# Patient Record
Sex: FEMALE | URBAN - METROPOLITAN AREA
[De-identification: names, ages, dates, MRNs, and addresses within clinical notes are randomized per-mention and may not be internally consistent; named-entity substitution may affect disease eponyms.]

---

## 2022-01-05 ENCOUNTER — OFFICE VISIT (OUTPATIENT)
Dept: URBAN - METROPOLITAN AREA TELEHEALTH 2 | Facility: TELEHEALTH | Age: 47
End: 2022-01-05

## 2022-01-05 ENCOUNTER — TELEPHONE ENCOUNTER (OUTPATIENT)
Dept: URBAN - METROPOLITAN AREA CLINIC 92 | Facility: CLINIC | Age: 47
End: 2022-01-05

## 2022-01-05 ENCOUNTER — WEB ENCOUNTER (OUTPATIENT)
Dept: URBAN - METROPOLITAN AREA CLINIC 92 | Facility: CLINIC | Age: 47
End: 2022-01-05

## 2022-01-31 ENCOUNTER — OFFICE VISIT (OUTPATIENT)
Dept: URBAN - METROPOLITAN AREA TELEHEALTH 2 | Facility: TELEHEALTH | Age: 47
End: 2022-01-31
Payer: COMMERCIAL

## 2022-01-31 ENCOUNTER — TELEPHONE ENCOUNTER (OUTPATIENT)
Dept: URBAN - METROPOLITAN AREA CLINIC 17 | Facility: CLINIC | Age: 47
End: 2022-01-31

## 2022-01-31 DIAGNOSIS — R10.11 RIGHT UPPER QUADRANT PAIN: ICD-10-CM

## 2022-01-31 DIAGNOSIS — Z12.11 COLON CANCER SCREENING: ICD-10-CM

## 2022-01-31 DIAGNOSIS — K21.9 GERD WITHOUT ESOPHAGITIS: ICD-10-CM

## 2022-01-31 DIAGNOSIS — R63.4 WEIGHT LOSS: ICD-10-CM

## 2022-01-31 PROCEDURE — 99204 OFFICE O/P NEW MOD 45 MIN: CPT | Performed by: INTERNAL MEDICINE

## 2022-01-31 PROCEDURE — 99244 OFF/OP CNSLTJ NEW/EST MOD 40: CPT | Performed by: INTERNAL MEDICINE

## 2022-01-31 RX ORDER — OMEPRAZOLE 20 MG/1
CAPSULE, DELAYED RELEASE ORAL
Qty: 30 | Refills: 0 | Status: ACTIVE | COMMUNITY

## 2022-01-31 RX ORDER — SODIUM, POTASSIUM,MAG SULFATES 17.5-3.13G
354 ML SOLUTION, RECONSTITUTED, ORAL ORAL
Qty: 354 ML | Refills: 0 | OUTPATIENT
Start: 2022-01-31 | End: 2022-02-01

## 2022-01-31 NOTE — HPI-TODAY'S VISIT:
This is a 45 yo referred by Dr. Luisito Aceves for a colonoscopy.  A copy of this document will be sent to the referring provider. The patient denies abdominal pain, weight loss, rectal bleeding, constipation, diarrhea, and a change in bowel habits.  There is no family history of colon cancer or colon polyps.  Patient reports epigastric discomfort a few months ago.  An US was negative for gallbladder disease.  The pain is constant but not exacerbated with meals.  Burning pain  was relieved with omeprazole.  She admits to severe nausea resulting in a poor appetite.  She lost 18 pounds over 5 months.    She denies vomiting and dysphagia.  She denies black stools.  Naprosyn was prescribed for RUQ discomfort which she has not taken often.

## 2022-03-28 PROBLEM — 266435005: Status: ACTIVE | Noted: 2022-01-31

## 2022-04-08 ENCOUNTER — OFFICE VISIT (OUTPATIENT)
Dept: URBAN - METROPOLITAN AREA SURGERY CENTER 30 | Facility: SURGERY CENTER | Age: 47
End: 2022-04-08

## 2023-08-10 ENCOUNTER — LAB OUTSIDE AN ENCOUNTER (OUTPATIENT)
Dept: URBAN - METROPOLITAN AREA CLINIC 92 | Facility: CLINIC | Age: 48
End: 2023-08-10

## 2023-08-10 ENCOUNTER — OFFICE VISIT (OUTPATIENT)
Dept: URBAN - METROPOLITAN AREA CLINIC 92 | Facility: CLINIC | Age: 48
End: 2023-08-10
Payer: COMMERCIAL

## 2023-08-10 VITALS
TEMPERATURE: 97.2 F | HEIGHT: 62 IN | WEIGHT: 159 LBS | HEART RATE: 70 BPM | DIASTOLIC BLOOD PRESSURE: 74 MMHG | BODY MASS INDEX: 29.26 KG/M2 | SYSTOLIC BLOOD PRESSURE: 119 MMHG

## 2023-08-10 DIAGNOSIS — R10.821 RIGHT UPPER QUADRANT ABDOMINAL TENDERNESS WITH REBOUND TENDERNESS: ICD-10-CM

## 2023-08-10 PROCEDURE — 99213 OFFICE O/P EST LOW 20 MIN: CPT | Performed by: INTERNAL MEDICINE

## 2023-08-10 RX ORDER — OMEPRAZOLE 20 MG/1
CAPSULE, DELAYED RELEASE ORAL
Qty: 30 | Refills: 0 | Status: ON HOLD | COMMUNITY

## 2023-08-10 NOTE — HPI-OTHER HISTORIES
(January 2022) This is a 47 yo referred by Dr. Luisito Aceves for a colonoscopy.  A copy of this document will be sent to the referring provider. The patient denies abdominal pain, weight loss, rectal bleeding, constipation, diarrhea, and a change in bowel habits.  There is no family history of colon cancer or colon polyps.  Patient reports epigastric discomfort a few months ago.  An US was negative for gallbladder disease.  The pain is constant but not exacerbated with meals.  Burning pain  was relieved with omeprazole.  She admits to severe nausea resulting in a poor appetite.  She lost 18 pounds over 5 months.    She denies vomiting and dysphagia.  She denies black stools.  Naprosyn was prescribed for RUQ discomfort which she has not taken often.

## 2023-08-10 NOTE — PHYSICAL EXAM GASTROINTESTINAL
Abdomen,  soft, RUQ pain, nondistended,  normal bowel sounds,  Liver and Spleen,  no hepatomegaly present

## 2023-08-10 NOTE — HPI-TODAY'S VISIT:
This is a 49 yo female here for abdominal pain.  She was seen one year ago for similar complaints.  An EGD/colon was recommmended.  It was scheduled 2 months out but the patient wanted it sooner so it was done by another GI MD by Dr. Vasquez with [facility].   Per the patient's report both exams were normal.  Acid reflux symptoms resolved with meds and behavior modifications however she continues to experience RUQ pain once a  month which lasts a few days.  An US 2021 was negative for gallbladder disease was negative.   She denies vomiting, early satiety and weight loss.

## 2023-08-10 NOTE — PHYSICAL EXAM RECTAL:
normal tone, no external hemorrhoids, no masses palpable, no red blood, Tenderness on REAL, Internal hemorrhoids present

## 2024-02-22 ENCOUNTER — OV EP (OUTPATIENT)
Dept: URBAN - METROPOLITAN AREA CLINIC 92 | Facility: CLINIC | Age: 49
End: 2024-02-22

## 2024-02-22 RX ORDER — OMEPRAZOLE 20 MG/1
CAPSULE, DELAYED RELEASE ORAL
Qty: 30 | Refills: 0 | COMMUNITY

## 2024-02-29 ENCOUNTER — OV EP (OUTPATIENT)
Dept: URBAN - METROPOLITAN AREA CLINIC 98 | Facility: CLINIC | Age: 49
End: 2024-02-29

## 2024-03-08 ENCOUNTER — OV EP (OUTPATIENT)
Dept: URBAN - METROPOLITAN AREA CLINIC 105 | Facility: CLINIC | Age: 49
End: 2024-03-08

## 2024-03-14 ENCOUNTER — OV EP (OUTPATIENT)
Dept: URBAN - METROPOLITAN AREA CLINIC 98 | Facility: CLINIC | Age: 49
End: 2024-03-14

## 2024-03-21 ENCOUNTER — OV EP (OUTPATIENT)
Dept: URBAN - METROPOLITAN AREA CLINIC 98 | Facility: CLINIC | Age: 49
End: 2024-03-21
Payer: SELF-PAY

## 2024-03-21 ENCOUNTER — LAB (OUTPATIENT)
Dept: URBAN - METROPOLITAN AREA CLINIC 98 | Facility: CLINIC | Age: 49
End: 2024-03-21

## 2024-03-21 VITALS
WEIGHT: 160 LBS | HEART RATE: 73 BPM | DIASTOLIC BLOOD PRESSURE: 74 MMHG | BODY MASS INDEX: 29.44 KG/M2 | HEIGHT: 62 IN | SYSTOLIC BLOOD PRESSURE: 116 MMHG | TEMPERATURE: 97.5 F

## 2024-03-21 DIAGNOSIS — R10.11 RIGHT UPPER QUADRANT PAIN: ICD-10-CM

## 2024-03-21 DIAGNOSIS — K21.9 GERD WITHOUT ESOPHAGITIS: ICD-10-CM

## 2024-03-21 PROCEDURE — 99214 OFFICE O/P EST MOD 30 MIN: CPT | Performed by: INTERNAL MEDICINE

## 2024-03-21 RX ORDER — OMEPRAZOLE 20 MG/1
CAPSULE, DELAYED RELEASE ORAL
Qty: 30 | Refills: 0 | COMMUNITY

## 2024-03-21 NOTE — HPI-TODAY'S VISIT:
Ms. David is a  47 yo female here for followup patient visit for abdominal pain. Sees Dr. Varghese. Last seen on 8/10/23.   She had her HIDA scan in October 2023- says this was normal.  Pain has been going on for over 2 years.  Pain used to be constant, now intermittent. Has a few minutes of discomfort after eating.  Right upper quadrant, happens as soon as she swallows or a few minutes after she eats.  Happens with any food or liquid.  Omeprazole did not affect her pain.  No vomiting or weight loss.  She has a BM once daily. GERD is stable    I reviewed:  2/24/22 EGD: mild erythema path: gastropathy 2/24/22 colonoscopy: 3 polyps removed 2/24/22 colonoscopy path: TA- repeat in 2025

## 2024-07-24 ENCOUNTER — OFFICE VISIT (OUTPATIENT)
Dept: URBAN - METROPOLITAN AREA CLINIC 98 | Facility: CLINIC | Age: 49
End: 2024-07-24

## 2024-08-01 ENCOUNTER — OFFICE VISIT (OUTPATIENT)
Dept: URBAN - METROPOLITAN AREA CLINIC 98 | Facility: CLINIC | Age: 49
End: 2024-08-01

## 2024-08-01 NOTE — HPI-TODAY'S VISIT:
Ms. David is a  50 yo female here for followup patient visit for abdominal pain. Last seen on 3/21/24.  //GERD:   //RUQ pain: s/p CT  She had her HIDA scan in October 2023- says this was normal.  Pain has been going on for over 2 years.  Pain used to be constant, now intermittent. Has a few minutes of discomfort after eating.  Right upper quadrant, happens as soon as she swallows or a few minutes after she eats.  Happens with any food or liquid.  Omeprazole did not affect her pain.  No vomiting or weight loss.  She has a BM once daily. GERD is stable    I reviewed:  4/4/24 CT A/P: lobulated uterus - get pelvic US, right ovarian cyst 2/24/22 EGD: mild erythema path: gastropathy 2/24/22 colonoscopy: 3 polyps removed 2/24/22 colonoscopy path: TA- repeat in 2025

## 2024-08-08 ENCOUNTER — OFFICE VISIT (OUTPATIENT)
Dept: URBAN - METROPOLITAN AREA CLINIC 98 | Facility: CLINIC | Age: 49
End: 2024-08-08
Payer: SELF-PAY

## 2024-08-08 ENCOUNTER — DASHBOARD ENCOUNTERS (OUTPATIENT)
Age: 49
End: 2024-08-08

## 2024-08-08 VITALS
SYSTOLIC BLOOD PRESSURE: 111 MMHG | DIASTOLIC BLOOD PRESSURE: 73 MMHG | HEIGHT: 62 IN | WEIGHT: 165.8 LBS | HEART RATE: 73 BPM | BODY MASS INDEX: 30.51 KG/M2 | TEMPERATURE: 97.1 F

## 2024-08-08 DIAGNOSIS — K21.9 GASTRO-ESOPHAGEAL REFLUX DISEASE WITHOUT ESOPHAGITIS: ICD-10-CM

## 2024-08-08 DIAGNOSIS — R10.11 RIGHT UPPER QUADRANT PAIN: ICD-10-CM

## 2024-08-08 PROCEDURE — 99214 OFFICE O/P EST MOD 30 MIN: CPT | Performed by: INTERNAL MEDICINE

## 2024-08-08 RX ORDER — OMEPRAZOLE 20 MG/1
CAPSULE, DELAYED RELEASE ORAL
Qty: 30 | Refills: 0 | COMMUNITY

## 2024-08-08 RX ORDER — FAMOTIDINE 40 MG/1
1 TABLET TABLET, FILM COATED ORAL ONCE A DAY
Qty: 30 | Refills: 5 | OUTPATIENT
Start: 2024-08-08

## 2024-08-08 NOTE — HPI-TODAY'S VISIT:
Ms. aDvid is a  48 yo female here for followup patient visit for abdominal pain. Last seen on 3/21/24.  //GERD: Still having some reflux a few times per week.Not taking any meds for this. No trouble swallowing  //RUQ pain: s/p CT, patient feels better. She has increased her exercise.    I reviewed:  4/4/24 CT A/P: lobulated uterus - get pelvic US, right ovarian cyst 2/24/22 EGD: mild erythema path: gastropathy 2/24/22 colonoscopy: 3 polyps removed 2/24/22 colonoscopy path: TA- repeat in 2025

## 2025-08-11 ENCOUNTER — OFFICE VISIT (OUTPATIENT)
Dept: URBAN - METROPOLITAN AREA CLINIC 98 | Facility: CLINIC | Age: 50
End: 2025-08-11

## 2025-08-18 ENCOUNTER — OFFICE VISIT (OUTPATIENT)
Dept: URBAN - METROPOLITAN AREA CLINIC 98 | Facility: CLINIC | Age: 50
End: 2025-08-18

## 2025-08-27 ENCOUNTER — OFFICE VISIT (OUTPATIENT)
Dept: URBAN - METROPOLITAN AREA CLINIC 98 | Facility: CLINIC | Age: 50
End: 2025-08-27